# Patient Record
Sex: MALE | Race: WHITE | NOT HISPANIC OR LATINO | URBAN - METROPOLITAN AREA
[De-identification: names, ages, dates, MRNs, and addresses within clinical notes are randomized per-mention and may not be internally consistent; named-entity substitution may affect disease eponyms.]

---

## 2017-02-03 ENCOUNTER — OFFICE VISIT - HEALTHEAST (OUTPATIENT)
Dept: FAMILY MEDICINE | Facility: CLINIC | Age: 37
End: 2017-02-03

## 2017-02-03 DIAGNOSIS — K21.9 GERD (GASTROESOPHAGEAL REFLUX DISEASE): ICD-10-CM

## 2017-02-03 ASSESSMENT — MIFFLIN-ST. JEOR: SCORE: 1922.31

## 2018-02-08 ENCOUNTER — OFFICE VISIT - HEALTHEAST (OUTPATIENT)
Dept: FAMILY MEDICINE | Facility: CLINIC | Age: 38
End: 2018-02-08

## 2018-02-08 DIAGNOSIS — J01.90 ACUTE BACTERIAL SINUSITIS: ICD-10-CM

## 2018-02-08 DIAGNOSIS — B96.89 ACUTE BACTERIAL SINUSITIS: ICD-10-CM

## 2018-02-08 ASSESSMENT — MIFFLIN-ST. JEOR: SCORE: 1944.99

## 2018-07-30 ENCOUNTER — RECORDS - HEALTHEAST (OUTPATIENT)
Dept: ADMINISTRATIVE | Facility: OTHER | Age: 38
End: 2018-07-30

## 2018-10-26 ENCOUNTER — RECORDS - HEALTHEAST (OUTPATIENT)
Dept: ADMINISTRATIVE | Facility: OTHER | Age: 38
End: 2018-10-26

## 2018-11-03 ENCOUNTER — RECORDS - HEALTHEAST (OUTPATIENT)
Dept: ADMINISTRATIVE | Facility: OTHER | Age: 38
End: 2018-11-03

## 2018-12-27 ENCOUNTER — RECORDS - HEALTHEAST (OUTPATIENT)
Dept: ADMINISTRATIVE | Facility: OTHER | Age: 38
End: 2018-12-27

## 2019-05-01 ENCOUNTER — OFFICE VISIT - HEALTHEAST (OUTPATIENT)
Dept: FAMILY MEDICINE | Facility: CLINIC | Age: 39
End: 2019-05-01

## 2019-05-01 DIAGNOSIS — M54.41 ACUTE RIGHT-SIDED LOW BACK PAIN WITH RIGHT-SIDED SCIATICA: ICD-10-CM

## 2020-02-05 ENCOUNTER — RECORDS - HEALTHEAST (OUTPATIENT)
Dept: ADMINISTRATIVE | Facility: OTHER | Age: 40
End: 2020-02-05

## 2020-05-27 ENCOUNTER — OFFICE VISIT - HEALTHEAST (OUTPATIENT)
Dept: FAMILY MEDICINE | Facility: CLINIC | Age: 40
End: 2020-05-27

## 2020-05-27 DIAGNOSIS — F43.0 ACUTE REACTION TO STRESS: ICD-10-CM

## 2020-05-27 DIAGNOSIS — M46.1 SACROILIITIS (H): ICD-10-CM

## 2020-06-29 ENCOUNTER — OFFICE VISIT - HEALTHEAST (OUTPATIENT)
Dept: FAMILY MEDICINE | Facility: CLINIC | Age: 40
End: 2020-06-29

## 2020-06-29 DIAGNOSIS — F43.0 ACUTE REACTION TO STRESS: ICD-10-CM

## 2020-06-29 RX ORDER — ESCITALOPRAM OXALATE 10 MG/1
10 TABLET ORAL DAILY
Qty: 90 TABLET | Refills: 0 | Status: SHIPPED | OUTPATIENT
Start: 2020-06-29

## 2020-06-29 ASSESSMENT — ANXIETY QUESTIONNAIRES
5. BEING SO RESTLESS THAT IT IS HARD TO SIT STILL: NOT AT ALL
2. NOT BEING ABLE TO STOP OR CONTROL WORRYING: NOT AT ALL
6. BECOMING EASILY ANNOYED OR IRRITABLE: NOT AT ALL
4. TROUBLE RELAXING: NOT AT ALL
GAD7 TOTAL SCORE: 0
IF YOU CHECKED OFF ANY PROBLEMS ON THIS QUESTIONNAIRE, HOW DIFFICULT HAVE THESE PROBLEMS MADE IT FOR YOU TO DO YOUR WORK, TAKE CARE OF THINGS AT HOME, OR GET ALONG WITH OTHER PEOPLE: NOT DIFFICULT AT ALL
7. FEELING AFRAID AS IF SOMETHING AWFUL MIGHT HAPPEN: NOT AT ALL
3. WORRYING TOO MUCH ABOUT DIFFERENT THINGS: NOT AT ALL
1. FEELING NERVOUS, ANXIOUS, OR ON EDGE: NOT AT ALL

## 2020-06-29 ASSESSMENT — PATIENT HEALTH QUESTIONNAIRE - PHQ9: SUM OF ALL RESPONSES TO PHQ QUESTIONS 1-9: 0

## 2020-06-30 ENCOUNTER — COMMUNICATION - HEALTHEAST (OUTPATIENT)
Dept: FAMILY MEDICINE | Facility: CLINIC | Age: 40
End: 2020-06-30

## 2020-06-30 DIAGNOSIS — M46.1 SACROILIITIS (H): ICD-10-CM

## 2020-07-01 RX ORDER — NAPROXEN 500 MG/1
500 TABLET ORAL 2 TIMES DAILY PRN
Qty: 60 TABLET | Refills: 0 | Status: SHIPPED | OUTPATIENT
Start: 2020-07-01

## 2021-05-26 ASSESSMENT — PATIENT HEALTH QUESTIONNAIRE - PHQ9: SUM OF ALL RESPONSES TO PHQ QUESTIONS 1-9: 0

## 2021-05-28 ASSESSMENT — ANXIETY QUESTIONNAIRES: GAD7 TOTAL SCORE: 0

## 2021-05-28 NOTE — PATIENT INSTRUCTIONS - HE
"1. Avoid excessive activities  2. If pain continues for 2 more weeks return to clinic for PT  3. Take ibuprofen 600 mg up to three times a day, take with food  4. If new symptoms--weakness, consistent numbness or tingling then return to clinic and would consider MRI  5. Modified rest    Patient education: Low back pain in adults (Beyond the Basics)  Author:CAITLIN Bustilloection :Caro Centeno MD, MPHDeputy :Elvira Rosa MD  Contributor Disclosures    All topics are updated as new evidence becomes available and our peer review process is complete.  Literature review current through: Mar 2019.  This topic last updated: Mar 21, 2018.      OVERVIEW  Low back pain is one of the most common disorders in the [place]. About 80 percent of people have at least one episode of low back pain during their lifetime.    Factors that increase the risk of developing low back pain include smoking, obesity, older age, female gender, physically strenuous work, sedentary work, a stressful job, job dissatisfaction and psychological factors such as anxiety or depression.    This topic review will focus on acute low back pain (lasting up to four weeks). Back pain in children and adolescents is discussed separately (see \"Patient education: Back pain in children and adolescents (Beyond the Basics)\"). More detailed information and information about subacute (lasting 4 to 12 weeks) and chronic (lasting more than 12 weeks) back pain is available by subscription. (See \"Evaluation of low back pain in adults\" and \"Treatment of acute low back pain\" and \"Subacute and chronic low back pain: Nonpharmacologic and pharmacologic treatment\" and \"Subacute and chronic low back pain: Nonsurgical interventional treatment\".)    STRUCTURE OF THE BACK  The back is formed by bones, muscles, nerves, and other tissues that work together to help us stand and bend. The bones of the back are called vertebrae, which together form the spinal column. " "The spinal column protects the spinal cord, part of the central nervous system that controls our ability to feel and move.    The spinal cord passes through an opening on the back of the vertebrae. The vertebrae are stacked one on top of another. Small nerves (called nerve roots) exit from the spinal cord and pass through spaces on the sides of the vertebrae. The spinal column extends below the base of the spinal cord. The nerve roots to the lower back and legs are together called the cauda equina, or horse's tail.    Between each pair of vertebrae in the spinal column is a disc composed of a tough outer tissue and a gel-like inner pulp. These discs protect the bones, acting like cushions or shock absorbers. The vertebrae are held together by ligaments and tendons, allowing the vertebrae to move together as the spinal column bends forwards, backwards, and side to side.    There are four main regions of the back; the cervical (C), thoracic (T), lumbar (L), and sacral (S) regions (figure 1).    ?The seven cervical vertebrae are located in the neck    ?The 12 thoracic vertebrae are located in the upper back    ?The five lumbar vertebrae are located in the lower back    ?The sacrum and coccyx are fused bones, found at the base of the spinal column    The vertebrae are numbered from top to bottom. As an example, the top lumbar vertebra is called the L1 vertebra. Low back pain occurs in the area of the lumbar and sacral vertebrae, most commonly at L4, L5, and S1.    CAUSES  Low back pain can have many causes. However, most people (>85 percent) have \"nonspecific low back pain,\" which means that there is not a specific disease or abnormality in the spine clearly causing the pain. Many people attribute their back pain to a degenerating disc or arthritis, although problems in muscles or ligaments or other causes may be equally responsible (figure 2).    Rarely, back pain is caused by a potentially serious spinal condition, " "such as infection, fracture, or tumor, or a disorder called cauda equina syndrome, which causes leg weakness and bowel or bladder dysfunction as well as back pain. Back pain that is associated with leg pain, numbness, or weakness can be due to a herniated disc or spinal stenosis. (See 'Lumbar spinal stenosis' below.)    Degenerative disc disease -- Wear and tear can lead to degenerative disc disease (breakdown of the spinal discs), with small cracks and tears and/or loss of fluid in the discs. This can lead to other changes, including the formation of bone spurs. Calling this condition a \"disease\" is somewhat misleading because these changes occur with normal aging and frequently cause no symptoms. In fact, many people have degenerative disc disease on radiographs or other imaging studies but have no pain or other symptoms.    Facet joint arthropathy -- Facet joint arthropathy refers to arthritis in the joints connecting the vertebrae to one another (facet joints). This can lead to bone spurs around the joint and may cause low back pain. However, like degenerative disc disease, facet joint arthropathy is very common with aging and many people have no symptoms.    Spondylolisthesis -- Spondylolisthesis is a condition in which one of the vertebrae of the lower spine slips forward in relation to another. Spondylolisthesis is usually caused by stress on the joints of the lower back and may be associated with facet joint arthropathy. Although this condition can cause low back pain and sciatica, sometimes it causes no symptoms at all and is noticed on a radiograph done for another reason.    Herniated disc -- Too much wear and tear on spinal discs can lead to herniation of a disc, in which the outer covering is weakened or torn and the soft inner tissue extrudes (a \"slipped disc\"). Herniated discs can cause leg pain or weakness if the disc presses on a nerve root (figure 3). However, herniated discs are frequently seen on " "radiographs, even in people with no low back pain. Herniated discs usually heal over time because the body breaks down the excess disc material and water within the disc is absorbed, relieving pressure or irritation on the nerve.    A bulging disc protrudes less than a herniated disc. It is more common than a herniated disc and is seen in half of people who have no back pain. A bulging disc usually causes no symptoms, although occasionally it can cause sciatica. (See 'Sciatica' below.)    Lumbar spinal stenosis -- Spinal stenosis is a condition in which the vertebral canal (the open space inside the vertebrae) is narrowed. This is often caused by bone spurs, which occur most often in older patients. Spinal stenosis can cause neurogenic claudication (see 'Neurogenic claudication' below). However, like herniated discs, spinal stenosis can be seen in people with no symptoms.    Less common causes -- Rarely, low back pain is caused by a serious spinal condition, such as an infection, tumor, or a disorder called cauda equina syndrome, which causes weakness and bowel or bladder dysfunction as well as low back pain. Other potential causes include spinal compression fractures, in which one or more vertebrae become fractured as a result of weakening and thinning of the bones due to osteoporosis.    In younger people, low back pain with morning stiffness can be associated with an inflammatory condition called ankylosing spondylitis. (See \"Patient education: Axial spondyloarthritis, including ankylosing spondylitis (Beyond the Basics)\".)    Occupational back pain -- Factors that may contribute to low back pain at work include poor posture while sitting or standing, sitting or standing for long periods of time, driving long distances, improper lifting techniques, frequent lifting, lifting excessively heavy loads, or repetitive high-impact activities. Low back pain is as common among clerical workers who sit for prolonged " periods as in people whose jobs require heavy lifting.    Psychological factors can contribute to low back pain. These include depression, anxiety, stress, job dissatisfaction, boredom, tension, as well as how the body responds to everyday physical demands. Workplace stress can be managed with counseling; a number of techniques are available. Resolving these psychological factors improves a person's chances of recovering from low back pain.    SYMPTOMS    Radiculopathy -- A common feature of low back pain is radiculopathy, which occurs when a nerve root is irritated by a protruding disc or arthritis of the spine. Radiculopathies usually cause radiating pain, numbness, tingling, or muscle weakness in the specific areas related to the affected nerve root, usually the lower leg. Many people with these conditions improve with limited or no treatment, as described below. (See 'Treatment' below.)    Sciatica -- Sciatica refers to the most common symptom of radiculopathy. It is a pain that occurs when one of the five spinal nerve roots, which are branches of the sciatic nerve, is irritated, causing a sharp or burning pain that extends down the back or side of the thigh, usually to the foot or ankle. You may also feel numbness or tingling. Occasionally, the sciatica may also be associated with muscle weakness in the leg or the foot. If a disc is herniated, sciatic pain often increases with coughing, sneezing, or bearing down.    Neurogenic claudication -- Neurogenic claudication is a type of pain that can occur when the spinal cord is compressed due to narrowing of the spinal canal from arthritis or other causes. The pain runs down the back to the buttocks, thighs, and lower legs, often involving both sides of the body. This may cause limping and weakness in the legs. Pain usually gets worse when extending the lower spine (eg, when standing or walking), and gets better when flexing the spine by sitting, stooping, or leaning  "forward.    When to seek help -- Some people with low back pain should be managed by a primary care or family medicine practitioner. If low back pain is caused by a serious condition, a neurosurgeon or orthopedist who specializes in back surgery is usually recommended. People who have any of the following should contact their health care practitioner for advice:    ?New back pain if you are 70 years or older.    ?Pain that does not go away, even at night or when lying down.    ?Weakness in one or both legs or problems with bladder, bowel, or sexual function can be signs of cauda equina syndrome, arising from compression of the nerve bundle at the base of the spine. These symptoms should be evaluated as soon as possible.    ?Back pain accompanied by unexplained fever or weight loss.    ?A history of cancer, a weakened immune system, osteoporosis, or the use of corticosteroids (eg, prednisone) for a prolonged period of time.    ?Back pain that is a result of falling or an accident, especially if you are older than 50 years.    ?Pain spreading into the lower leg, particularly if accompanied by weakness of the leg.    ?Back pain that does not improve within four weeks.    TESTS  The vast majority of people with low back pain improve within four to six weeks without treatment or with simple measures that can be performed at home. It is not usually necessary to consult a healthcare practitioner if the pain improves [1]. (See \"Treatment of acute low back pain\", section on 'Prognosis'.)    Imaging -- Imaging tests, including plain radiographs, computed tomography (CT) scanning, or a magnetic resonance imaging (MRI), may be recommended for people with certain conditions [2].    Radiographs -- Radiographs may be recommended for selected people who have risk factors or signs of infection, cancer, or vertebral compression fracture related to osteoporosis. Radiographs expose the body to radiation. (See 'When to seek help' " "above.)    However, radiographs do not usually show enough detail to diagnose a herniated disc or spinal stenosis. Other common conditions, such as degenerative disc disease, facet joint arthropathy, and disc space narrowing, are seen so frequently in people without low back pain that it is usually not helpful to get radiographs to look for these, especially since their presence does not change treatment in the first four to six weeks.    CT and MRI -- CT scanning and MRI provide detailed images of the soft tissues and bony structures of the back. A CT or MRI is usually necessary to diagnose a herniated disc or spinal stenosis. One of these tests may be recommended if there are risk factors or signs of cancer, if surgery is being considered, or if low back pain persists for more than four to six weeks and the cause of pain cannot be determined with other methods.    However, most people with low back pain do not require a CT or MRI. Disc and spine abnormalities are common even among people without low back pain. In fact, a herniated disc is seen on MRI or CT in 25 percent of people without low back pain. Like radiographs, CT scans expose the body to radiation. MRI is based on magnetic fields and does not require radiation.    TREATMENT  Unless low back pain is caused by a serious medical condition, a rapid recovery is expected, even if there is a bulging or herniated disc. The body breaks down bulging discs, taking pressure off the nerve. Care of an attack of low back pain includes several simple elements. (See \"Treatment of acute low back pain\".)    Remaining active -- Many people are afraid that they will hurt their back further or delay recovery by remaining active. However, remaining active is one of the best things you can do for your back. In fact, prolonged bed rest is not recommended. Studies have shown that people with low back pain recover faster when they remain active. Movement helps to relieve muscle " "spasms and prevents loss of muscle strength.    Although high-impact activities should be avoided, it is fine to continue doing regular day-to-day activities and light exercises, such as walking. If certain activities cause the back to hurt too much, it is fine to stop that activity and try another.    If back pain is severe, bedrest may be necessary for a short period of time, generally no more than one day [3]. When in bed, the most comfortable position may be to lie on the back with a pillow behind the knees and the head and shoulders elevated, or to lie on the side with the upper knee bent and a pillow between the knees.    Heat -- Using a heating pad can help with low back pain during the first few weeks. It is not clear if cold packs help as well [4].    Work -- Most experts recommend that people with low back pain continue to work so long as it is possible to avoid prolonged standing or sitting, heavy lifting, and twisting. Some people need to stay home from work if their occupation does not allow them to sit or stand comfortably. While standing at work, stepping on a block of wood with one foot (and periodically alternating the foot on the block) may be helpful.    Pain medications -- You can try taking an over-the-counter medication to help relieve pain. Nonsteroidal antiinflammatory drugs (NSAIDs), such as aspirin, ibuprofen (sample brand names: Advil, Motrin), and naproxen (brand name: Aleve), may work better than acetaminophen (brand name: Tylenol) for low back pain.    If medication is needed, it is usually more effective to take a dose on a regular basis for three to five days, rather than using the medication only when the pain becomes unbearable. (See \"Patient education: Nonsteroidal antiinflammatory drugs (NSAIDs) (Beyond the Basics)\".)    Muscle relaxants (eg, cyclobenzaprine [brand name: Flexeril]) are available by prescription but can cause drowsiness and are probably no better than ibuprofen in " relieving pain [5]. Muscle relaxants may be helpful before bedtime when used for a short time. People who need to be alert, such as while driving or operating machinery, should not use muscle relaxants.    Opioids (drugs derived from morphine) are not recommended because they have not been found to be more effective than other pain medications for treating chronic back pain; additionally, they have a relatively high risk of side effects and the potential to cause harm with long-term use.    Exercise -- A program of exercises can help to increase back flexibility and strengthen the muscles that support the back [6]. Although starting back exercises or stretching immediately after a new episode of low back pain might temporarily increase the pain, the exercise may reduce the total duration of pain and prevent recurrent episodes.    Recommended activities include those that involve strengthening and stretching, such as walking, swimming, use of a stationary bicycle, and low-impact aerobics. Avoid activities that involve twisting, bending, are high-impact, or make the back hurt more. Some specific exercises may help strengthen the muscles of the lower back. People with frequent episodes of low back pain should continue these exercises indefinitely to prevent new episodes.    Physical therapy -- If back pain has been present for more than four to six weeks or there are signs that the back pain is not improving, a healthcare practitioner may recommend working with a physical therapist to develop a formal exercise program. Exercise programs may involve stretching, flexion and extension exercises, strengthening, aerobic activity, general overall fitness, or some combination of these components. The physical therapist may directly supervise exercise sessions or can teach the person to perform the exercise program at home.    Psychological therapy -- People who have a lot of fear about moving because of their back pain, feel  "hopeless about improving, have depression or anxiety, or are otherwise having trouble coping with their back pain can benefit from cognitive behavioral therapy, which is a type of psychological therapy. Cognitive behavioral therapy involves educating the patient, correcting mistaken beliefs about low back pain, setting activity goals, and working to achieve those goals. Cognitive behavioral therapy techniques may be performed by a psychologist, physical therapist, or clinician [7].    Manipulation -- \"Spinal manipulation\" is a technique sometimes used by physical therapists, chiropractors, osteopaths, massage therapists, and others to treat acute and chronic back pain. It involves moving the joints of the spine beyond the normal range of motion. Studies suggest that spinal manipulation may provide modest pain relief and improved function for patients with acute low back pain (pain that has come on within the last four weeks) or chronic (longer-term) low back pain and generally appears to be safe. If you want to try this approach, talk with your doctor or nurse about how to integrate it into your treatment plan.    Acupuncture -- Acupuncture involves inserting very fine needles into specific points, as determined by traditional Chinese maps of the body's flow of energy. Acupuncture may be a reasonable option for interested patients with access to an acupuncturist. In general, acupuncture is a safe treatment that may be helpful for chronic back pain [8]. It is not clear if acupuncture is helpful for people with recent-onset (acute) low back pain.    Massage and yoga -- A few studies have evaluated massage and yoga for back pain treatment. The benefit of massage or yoga was found to be greatest in people with chronic back pain who expected to improve with one of these treatments [9]. (See \"Treatment of acute low back pain\", section on 'Other'.)    Some people feel better if they try something called \"mindfulness-based " "stress reduction.\" This involves attending a group program to practice relaxation and meditation techniques with someone trained in this approach and has mainly been studied for chronic low back pain.    Other treatments    ?Injections - Some clinicians recommend injections of a local anesthetic into the soft tissues of the back to relieve chronic pain, although it is not clear if these injections are effective. The areas targeted by these injections are called trigger points. Trigger-point injections may be of benefit in people with chronic back pain.    Injections of a steroid medication are sometimes recommended for people with chronic low back pain with sciatica or radiculopathy. The injection is given into the epidural space, located below the spinal cord. Epidural steroid injections do appear to improve pain slightly at two and six weeks after the injection, but not at 3, 6, or 12 months after the injection. There is no evidence that epidural steroid injections are helpful for people with back pain without sciatica.    ?Corsets and braces are not helpful in treating or preventing low back pain.    ?Traction involves the use of weights to realign or pull the spinal column into alignment. Clinical studies have shown no benefit from traction in the treatment of back pain in the first few weeks.    ?Mattress choice - The benefit of a firm mattress in preventing or treating low back pain has not been proven. In one study, medium-firm mattresses were more likely to improve chronic back pain compared with firm mattresses [10].    ?Other interventions include ultrasound, interferential therapy, short-wave diathermy transcutaneous electrical nerve stimulation, and low-level laser therapy, all of which involve applying energy to the skin's surface. None of these interventions have been proven to be effective, particularly during the first four to six weeks of an episode of back pain.    SURGERY  Only a small minority of " "people with low back pain will require surgery. Surgery is necessary if there is evidence of cauda equina syndrome (problems with the nerves at the base of the spinal cord), another serious back condition like a tumor or infection, or severe weakness due to spinal stenosis or compression of a nerve root. (See \"Subacute and chronic low back pain: Surgical treatment\".)    Surgery may also be considered for people with persistent radiculopathy due to herniated disc or spinal stenosis that has not responded to other (nonsurgical) therapies. There is controversy about whether surgery is beneficial for people with degenerative disc disease alone.    Referral to an orthopedic surgeon or neurosurgeon is recommended under the following circumstances:    ?Increasing neurologic problems (measurable weakness)    ?Loss of sensation (eg, numbness) or bladder and bowel symptoms    ?Failure to improve after four to six weeks of nonsurgical management, with persistent and severe sciatica and evidence of nerve root involvement    PREVENTION  There are a number of ways to prevent low back pain from returning. Perhaps the most important are exercise and staying active. Regular exercise that improves cardiovascular fitness can be combined with specific exercises to strengthen the muscles of the hips and torso. The abdominal muscles are particularly important in supporting the lower back and preventing back pain. It is also important to avoid activities that involve repetitive bending or twisting and high-impact activities that increase stress in the spine.    Bend and lift correctly -- People with low back pain should learn the right way to bend and lift. As an example, lifting should always be done with the knees bent and the abdominal muscles tightened to avoid straining the weaker muscles in the lower back (picture 1).    Take a break -- People who sit or stand for long periods should change positions often and use a chair with " appropriate support for the back. An office chair should be readjusted several times throughout the day to avoid sitting in the same position. Taking brief but frequent breaks to walk around will also prevent pain due to prolonged sitting or standing. People who  place for long periods can try placing a block of wood on the floor, stepping up and down every few minutes.    WHERE TO GET MORE INFORMATION  Your healthcare practitioner is the best source of information for questions and concerns related to your medical problem.    This article will be updated as needed on our website ([email or web address]). Related topics for patients, as well as selected articles written for healthcare professionals, are also available. Some of the most relevant are listed below.    Patient level information -- [employer] offers two types of patient education materials.    The Basics -- The Basics patient education pieces answer the four or five key questions a patient might have about a given condition. These articles are best for patients who want a general overview and who prefer short, easy-to-read materials.

## 2021-05-28 NOTE — PROGRESS NOTES
"Assessment & Plan  1. Acute right-sided low back pain with right-sided sciatica  -No red flag symptoms to require immediate imaging or referral  -Advised modified rest (daily activities, but no martial arts/yoga for at least 2 weeks).  -continue to take ibuprofen scheduled for another week, no more than 600 mg three times a day  -If no improvement in another 2 weeks, then would refer to physical therapy (told the patient ok to send message for request for PT, if no new symptoms does not need appoinment)  -RTC if new symptoms like weakness, continual numbness or tingling occure  -safe lifting strategies at work    Essie Hanley MD    Subjective  Chief Complaint:  Back Pain (pt states lower back pain 6 days now, sitting and standing for long time makes pain worst )    HPI:   Jose Antonio Raphael is a 38 y.o. male who presents for lower back pain    38 year old male with no PMH.  He woke up with pain a week ago-- mild at first but getting worse.  Has had \"back injuries\" in the past-- episodes of back pain that improved on their own in about 1 week.  Has missed work for back pain in the past, not during this episode.  Prior work up done in 2012 included ESR, TORRES, FR and Uric acid (all normal).  Never had a imaging done.  In the past the pain got better on its own.  Taking ibuprofen 800 mg two times a day.  Pain shoots down the right leg to the calf. Feels pain is worse with standing, but gets better when he walks a little bit.  The pain is better lying flat on his back.  Worse bending forward.  No history of trauma.  The pain is 3/10, up to 8/10 wih bending.  No weakness or constant numbness or tingling.    Works as a  and is very active-- in Martial Arts.     Denies fevers, chills, no weight loss.  NO loss of control of bowel or bladder.     Allergies:  is allergic to omeprazole and penicillins.    SH/FH:  Social History and Family History reviewed and updated.   Tobacco Status:  He  reports that he has " quit smoking. He has never used smokeless tobacco.    Review of Systems:    Constitutional: No Weight Change, No Fever, No Chills, No Night Sweats,   ENT/Mouth: negative  Eyes: No Vision Changes   Cardiovascular: No Chest Pain  Respiratory: No Cough  Gastrointestinal: No Nausea  Musculoskeletal: back pain  Skin: No Skin Lesions  Neuro: No Weakness, No Numbness, No Headache  Psych: No Depression  Heme/Lymph: No Bruising,  Endocrine: No Polyuria    Objective  Vitals:    05/01/19 1525   BP: 110/70   Patient Site: Right Arm   Patient Position: Sitting   Cuff Size: Adult Large   Pulse: 64   Resp: 16   Weight: 220 lb (99.8 kg)       Physical Exam:  GENERAL: Alert, well-appearing, in no acute distress.   PSYCH: Pleasant mood, affect appropriate.  Good eye contact.  SKIN: No apparent lesions  HEAD: Normocephalic, atraumatic  CV: Regular rate and rhythm without murmurs, rubs or gallops. No peripheral edema  RESP: No increased work of breathing.  Lung sounds bilateral, clear, symmetric excursion.   Back: normal alignment, no stepoffs, pain on palpation at about L5-S1, more on the right paraspinous muscle then on the midline.  ROM normal of rotation and lateral flexion, limited to forward flexion to about 60 degrees secondary to pain.  Straight leg test did not reproduce neurological symptoms.    MSK: Strength of lower extremities was normal and symmetric   NEURO: Gross sensation of LE normal.  DTR 2/4 at patella and achilles bilaterally

## 2021-05-30 VITALS — HEIGHT: 72 IN | WEIGHT: 217 LBS | BODY MASS INDEX: 29.39 KG/M2

## 2021-06-01 VITALS — HEIGHT: 72 IN | BODY MASS INDEX: 30.07 KG/M2 | WEIGHT: 222 LBS

## 2021-06-03 VITALS — BODY MASS INDEX: 29.84 KG/M2 | WEIGHT: 220 LBS

## 2021-06-08 NOTE — PROGRESS NOTES
"Jose Antonio Raphael is a 39 y.o. male who is being evaluated via a billable telephone visit.      The patient has been notified of following:     \"This telephone visit will be conducted via a call between you and your physician/provider. We have found that certain health care needs can be provided without the need for a physical exam.  This service lets us provide the care you need with a short phone conversation.  If a prescription is necessary we can send it directly to your pharmacy.  If lab work is needed we can place an order for that and you can then stop by our lab to have the test done at a later time.    Telephone visits are billed at different rates depending on your insurance coverage. During this emergency period, for some insurers they may be billed the same as an in-person visit.  Please reach out to your insurance provider with any questions.    If during the course of the call the physician/provider feels a telephone visit is not appropriate, you will not be charged for this service.\"    Patient has given verbal consent to a Telephone visit? Yes    What phone number would you like to be contacted at? [phone]    Patient would like to receive their AVS by AVS Preference: Mail a copy.    Additional provider notes:     Patient is a 38 yo male who presents with chronic low back pain.    1) Low Back Pain.  Patient notes that pain is present 4 inches above his hips.  He was evaluated for this 1 year ago with Dr. Hanley and on her exam, she noted the pain was at his SI joint.  He notes that over time it has gotten worse.  He cannot recall a specific injury, but he notes that he has worked a physical job of 20 years and has been hurt doing [employer].  He drives truck all week.  At the beginning of the week it hurts at the end of the day at work, but as the week progresses, he gets more and more sore.  Over the weekend, he does start to heal.  He got a new seat for the truck, but it is not helpful.  Because it " "was just memorial day weekend, he felt 90% improved at the beginning of the week.  Occasionally he has a shooting pain down to the right buttock where the hamstring inserts.  It is worse with side bending.  When it is this bad, he has to be extremely careful with the way that he bends at the waist, even to pick something off of the floor.  He notes no numbness or tingling.  He worked with a chiropractor, but it did nothing.  He does not tend to take ibuprofen.  He found that letting his head hang over the side of the bed, he can feel a \"pop\" and something open up, which helps, but it is temporary.    2) Anxiety: started since coronavirus.  He is still working, but there is a lot more stress.  His mother-in-law and her  and dog were about to move to [place] to relocate long term just before the  closed it's borders.  Now they live with him.  He says he feels like a guest in his own home.  It is crowded with his wife and kids.  That is the most stressful thing.  His wife has noticed a change in his behavior.  He feels irrational, low patience.  He is starting to have some panic attacks where he feels shakey, and he describes it \" like there is a hole in my chest.\"  It has happened a few times, but he breathes through it.   Even though his wife works from home and he is out of the house for work, he is more bothered by the situation.    Objective   Gen: Patient is alert and oriented.  Psych Exam:  Behavior: agitated when discussing his in laws.  Mood:anxious   Affect:normal   Eye Contact:unable to tell by phone.   Thought Content: normal   Insight:normal    Judgement: normal           Assessment/Plan:  1. Acute reaction to stress  Patient notes he never had anxiety in the past.  His greatest stressor is the coronavirus pandemic and the fact that his mother-in-law and her  now reside with them.  There is no short term plan for them to move out at anytime.  Essentially they will be there until they can " move to [place] as they had planned.  Patient wanted to take a medication as needed, but given that he is experiencing symptoms on a daily basis without an end in sight, I would recommend a longer acting SSRI.    I discussed at length the instructions for use, potential side effects, and expected outcomes.  Discussed that this medication can be sedating initially, and therefore recommend taking just 5 mg qam x 5-7 days starting on the weekend.  If he is too sedated to drive, we need to consider a different medication.  Recommend close follow up in 4 weeks, sooner if any difficulties.    - escitalopram oxalate (LEXAPRO) 10 MG tablet; Take 1 tablet (10 mg total) by mouth daily.  Dispense: 30 tablet; Refill: 1    2. Sacroiliitis (H)  Patient notes that he has not time for appointments.  They are very short staffed at the moment.  I had discussed physical therapy with Howard Kendall as his pain has been ongoing since last year.  He would like something to help with the pain for now.  Discussed he can continue doing his exercise.  I will start him on a long acting antiinflammatory.  Discussed taking with food to avoid stomach upset.  Discussed in the end, he would benefit from back strengthening.    - naproxen (NAPROSYN) 500 MG tablet; Take 1 tablet (500 mg total) by mouth 2 (two) times a day as needed (for back pain.  Take with meals to avoid stomach upset.).  Dispense: 60 tablet; Refill: 0      Phone call duration:  20 minutes    Stacie Lafleur CMA

## 2021-06-08 NOTE — PROGRESS NOTES
Assessment/Plan:        Diagnoses and all orders for this visit:    GERD (gastroesophageal reflux disease)    I have asked him to try Zantac 300 mg a day. To give this at least a week of trial. If this is effective, he should stay on this   For a month. If not effective, then he should call.   Avoid eating for 2 hours before sleep   Avoid ETOH, fatty foods, spicy foods.   FU prn.         Subjective:    Patient ID: Jose Antonio Raphael is a 36 y.o. male.    Gastroesophageal Reflux   He complains of abdominal pain, belching, chest pain and heartburn. He reports no choking, no coughing, no early satiety, no hoarse voice, no nausea, no sore throat, no stridor, no water brash or no wheezing. This is a new problem. The current episode started 1 to 4 weeks ago. The problem occurs frequently. The problem has been unchanged. The heartburn duration is several minutes. The heartburn is located in the abdomen. The heartburn is of moderate intensity. The heartburn does not wake him from sleep. The heartburn does not limit his activity. The heartburn changes with position. The symptoms are aggravated by certain foods. Pertinent negatives include no fatigue or weight loss. Risk factors include lack of exercise (weight gain). Treatments tried: tried Zantac once but then wanted to get it checked out  The treatment provided no relief.       The following portions of the patient's history were reviewed and updated as appropriate: allergies, current medications, past family history, past medical history, past social history, past surgical history and problem list.    Review of Systems   Constitutional: Negative for appetite change, fatigue, unexpected weight change and weight loss.   HENT: Negative for hoarse voice and sore throat.    Respiratory: Negative for cough, choking and wheezing.    Cardiovascular: Positive for chest pain.   Gastrointestinal: Positive for abdominal pain and heartburn. Negative for abdominal distention, blood in  stool, constipation, diarrhea and nausea.   All other systems reviewed and are negative.            Objective:    Physical Exam   Constitutional: He appears well-developed and well-nourished. No distress.   Cardiovascular: Normal rate, regular rhythm and normal heart sounds.  Exam reveals no gallop and no friction rub.    No murmur heard.  Pulmonary/Chest: He has no wheezes. He has no rales.   Abdominal: Soft. Bowel sounds are normal. He exhibits no distension and no mass. There is no tenderness. There is no guarding.   Skin: Skin is warm and dry.   Nursing note and vitals reviewed.

## 2021-06-09 NOTE — TELEPHONE ENCOUNTER
RN cannot approve Refill Request    RN can NOT refill this medication med is not covered by policy/route to provider     . Last office visit: Visit date not found Last Physical: Visit date not found Last MTM visit: Visit date not found Last visit same specialty: 5/1/2019 Essie Hanley MD.  Next visit within 3 mo: Visit date not found  Next physical within 3 mo: Visit date not found      Mercedes De La Torre, [facility] Triage/Med Refill 7/1/2020    Requested Prescriptions   Pending Prescriptions Disp Refills     naproxen (NAPROSYN) 500 MG tablet [Pharmacy Med Name: NAPROXEN 500 MG TABLET] 60 tablet 0     Sig: TAKE 1 TABLET (500 MG TOTAL) BY MOUTH 2 (TWO) TIMES A DAY AS NEEDED (FOR BACK PAIN. TAKE WITH MEALS TO AVOID STOMACH UPSET.).       There is no refill protocol information for this order

## 2021-06-09 NOTE — PROGRESS NOTES
"Jose Antonio Raphael is a 39 y.o. male who is being evaluated via a billable telephone visit.      The patient has been notified of following:     \"This telephone visit will be conducted via a call between you and your physician/provider. We have found that certain health care needs can be provided without the need for a physical exam.  This service lets us provide the care you need with a short phone conversation.  If a prescription is necessary we can send it directly to your pharmacy.  If lab work is needed we can place an order for that and you can then stop by our lab to have the test done at a later time.    Telephone visits are billed at different rates depending on your insurance coverage. During this emergency period, for some insurers they may be billed the same as an in-person visit.  Please reach out to your insurance provider with any questions.    If during the course of the call the physician/provider feels a telephone visit is not appropriate, you will not be charged for this service.\"    Patient has given verbal consent to a Telephone visit? Yes    What phone number would you like to be contacted at? [phone]    Patient would like to receive their AVS by AVS Preference: Mail a copy.    Additional provider notes:     Patient is a 39-year-old male who presents today for follow-up med check.    In the interim, patient notes that his sacroiliitis is significantly better.  He is no longer having a buildup of low back pain that disturbs his sleep is a workweek.    With regards to acute reaction to stress: Patient notes that his mother-in-law and her  ended up moving to [place] to go live with his sister-in-law until the border opens up.  He notes that they still have quite a number of stressors.  His wife is interviewing for jobs out-of-state, so there is a possibility that he could be moving.  He to had just recently finished his degree for a new job and is currently interviewing.  He notes there " "could be significant big changes ahead yet for the rest of the year.  He notes that he feels great on the medication.  He has had no anxiety attacks.  He finds he has much more patience.  He has no difficulty guilty with grogginess.  He notes no changes in libido, but perhaps some delayed ejaculation.  He notes that his wife has noticed a significant difference and considers it a big improvement.  He states \"I feel so much better.\"    Patient Active Problem List    Diagnosis Date Noted     Carpal Tunnel Syndrome      Polyarthritis Of Multiple Sites      Current Outpatient Medications   Medication Instructions     escitalopram oxalate (LEXAPRO) 10 mg, Oral, DAILY     FLUCELVAX QUAD [id number], PF, 60 mcg (15 mcg x 4)/0.5 mL Syrg TO BE ADMINISTERED BY PHARMACIST FOR IMMUNIZATION     naproxen (NAPROSYN) 500 mg, Oral, 2 times daily PRN       Little interest or pleasure in doing things: Not at all  Feeling down, depressed, or hopeless: Not at all  Trouble falling or staying asleep, or sleeping too much: Not at all  Feeling tired or having little energy: Not at all  Poor appetite or overeating: Not at all  Feeling bad about yourself - or that you are a failure or have let yourself or your family down: Not at all  Trouble concentrating on things, such as reading the newspaper or watching television: Not at all  Moving or speaking so slowly that other people could have noticed. Or the opposite - being so fidgety or restless that you have been moving around a lot more than usual: Not at all  Thoughts that you would be better off dead, or of hurting yourself in some way: Not at all  PHQ-9 Total Score: 0  If you checked off any problems, how difficult have these problems made it for you to do your work, take care of things at home, or get along with other people?: Not difficult at all    How difficult did these problems make it for you to do your work, take care of things at home or get along with other people? : Not difficult " at all (2020 11:00 AM)  Feeling nervous, anxious, or on edge: 0 (2020 11:00 AM)  Not being able to stop or control worryin (2020 11:00 AM)  Worrying too much about different things: 0 (2020 11:00 AM)  Trouble relaxin (2020 11:00 AM)  Being so restless that it's hard to sit still: 0 (2020 11:00 AM)  Becoming easily annoyed or irritable: 0 (2020 11:00 AM)  Feeling afraid as if something awful might happen: 0 (2020 11:00 AM)  CORDELIA 7 Total Score: 0 (2020 11:00 AM)  How difficult did these problems make it for you to do your work, take care of things at home or get along with other people? : Not difficult at all (2020 11:00 AM)        Assessment/Plan:  1. Acute reaction to stress  - escitalopram oxalate (LEXAPRO) 10 MG tablet; Take 1 tablet (10 mg total) by mouth daily.  Dispense: 90 tablet; Refill: 0  Patient is doing well on the current medication.  Given the upcoming changes ahead in their lives, he would like to continue on his current dose.  There is a possibility they could be moving out of state in the next several months.  I discussed that I will refill the sent 90 tablets.  Asked that he check in with me in the case the move out of state slightly can do 1 more med check.  Recommend next evaluation in 3 months.        Phone call duration:  12 minutes    Malorie Cardoza

## 2021-06-15 NOTE — PROGRESS NOTES
OFFICE VISIT - FAMILY MEDICINE     ASSESSMENT AND PLAN     1. Acute bacterial sinusitis  azithromycin (ZITHROMAX Z-JESSICA) 250 MG tablet   Continue with good hydration, extra vitamin C, cough drops as needed, take the prescribed antibiotic as directed, possible side effect discussed.  Return if not improving.  Right medial epicondylitis of the right elbow, continue symptomatic care, protective soft support at work, return if not improving.  CHIEF COMPLAINT   Facial Pain (X 8 DAYS) and Cough (X 1 WK)    HPI   Jose Antonio Raphael is a 37 y.o. male.  Updated MIIC - Needs TDAP  URI symptoms started about 8-10 days ago, with runny nose, nasal congestion, dry cough, overall symptom seems to be worsening, patient has tried pretty much everything over-the-counter, included Mucinex, DayQuil, NyQuil etc. with no improvement of his symptoms, for the past few days been feeling pressure, pointing primarily in the maxillary sinuses area chronic cough that is nonproductive.  Denies any fever chills.  No nausea vomiting or diarrhea.  Also been experiencing right medial elbow pain for the last several days, he is a , usually put his right elbow on an elbow support, denies any new injury.      Review of Systems As per HPI, otherwise negative.    OBJECTIVE   /60 (Patient Site: Right Arm)  Pulse 68  Temp 97.5  F (36.4  C) (Oral)   Resp 13  Ht 6' (1.829 m)  Wt 222 lb (100.7 kg)  BMI 30.11 kg/m2  Physical Exam   Constitutional: He is oriented to person, place, and time. He appears well-developed and well-nourished.   HENT:   Head: Normocephalic and atraumatic.   Neck: Normal range of motion. Neck supple. No JVD present. No tracheal deviation present. No thyromegaly present.   Cardiovascular: Normal rate, regular rhythm, normal heart sounds and intact distal pulses.  Exam reveals no gallop and no friction rub.    No murmur heard.  Pulmonary/Chest: Effort normal and breath sounds normal. No respiratory distress.  He has no wheezes. He has no rales.   Musculoskeletal: He exhibits tenderness (Right medial epicondyle area, no swelling, no limited range of motion). He exhibits no edema or deformity.   Lymphadenopathy:     He has no cervical adenopathy.   Neurological: He is alert and oriented to person, place, and time. Coordination normal.   Psychiatric: He has a normal mood and affect. Judgment and thought content normal.       [facility]   No family history on file.  Social History     Social History     Marital status:      Spouse name: N/A     Number of children: N/A     Years of education: N/A     Occupational History     Not on file.     Social History Main Topics     Smoking status: Former Smoker     Smokeless tobacco: Never Used     Alcohol use Not on file     Drug use: Not on file     Sexual activity: Not on file     Other Topics Concern     Not on file     Social History Narrative     No narrative on file     Relevant history was reviewed with the patient today, unless noted in HPI, nothing is pertinent for this visit.  [facility]     Patient Active Problem List    Diagnosis Date Noted     Carpal Tunnel Syndrome      Overview Note:     Created by Conversion         Polyarthritis Of Multiple Sites      Overview Note:     Created by Conversion    Replacement Utility updated for latest IMO load       No past surgical history on file.    RESULTS/CONSULTS (Lab/Rad)   No results found for this or any previous visit (from the past 168 hour(s)).  No results found.  MEDICATIONS     No current outpatient prescriptions on file prior to visit.     No current facility-administered medications on file prior to visit.        HEALTH MAINTENANCE / SCREENING   PHQ-2 Total Score: 0 (2/8/2018 12:57 PM), No Data Recorded,No Data Recorded  Immunization History   Administered Date(s) Administered     Influenza,seasonal quad, PF, 36+MOS 09/12/2017     Health Maintenance   Topic     TDAP ADULT ONE TIME DOSE      ADVANCE DIRECTIVES DISCUSSED WITH  PATIENT      INFLUENZA VACCINE RULE BASED (1)     TD 18+ HE        Jeanette Miller MD  Family Medicine, [facility]     This note was dictated using a voice recognition software.  Any grammatical or context distortion are unintentional and inherent to the software.